# Patient Record
Sex: MALE | HISPANIC OR LATINO | URBAN - METROPOLITAN AREA
[De-identification: names, ages, dates, MRNs, and addresses within clinical notes are randomized per-mention and may not be internally consistent; named-entity substitution may affect disease eponyms.]

---

## 2019-03-30 ENCOUNTER — EMERGENCY (EMERGENCY)
Facility: HOSPITAL | Age: 34
LOS: 1 days | Discharge: HOME | End: 2019-03-30
Admitting: EMERGENCY MEDICINE
Payer: MEDICAID

## 2019-03-30 VITALS
HEART RATE: 72 BPM | SYSTOLIC BLOOD PRESSURE: 124 MMHG | OXYGEN SATURATION: 100 % | TEMPERATURE: 98 F | DIASTOLIC BLOOD PRESSURE: 97 MMHG | RESPIRATION RATE: 16 BRPM

## 2019-03-30 PROCEDURE — 73030 X-RAY EXAM OF SHOULDER: CPT | Mod: 26,LT

## 2019-03-30 PROCEDURE — 99283 EMERGENCY DEPT VISIT LOW MDM: CPT

## 2019-03-30 NOTE — ED ADULT TRIAGE NOTE - CHIEF COMPLAINT QUOTE
Pt c/o L shoulder pain x 7 months worsened in past month. States he broke his arm & got sx but "wasn't finished... it's splinted.. there's a bone sticking out."

## 2019-03-30 NOTE — ED PROVIDER NOTE - OBJECTIVE STATEMENT
34 y/o male w/ left shoulder pain x3 years. Pt admits to left shoulder injury while living in [place]. Pt had shoulder surgery at that time for "broken shoulder". 32 y/o male w/ left shoulder pain x3 years. Pt admits to left shoulder injury while living in [place]. Pt had shoulder surgery at that time for "broken shoulder". Pt describes that his shoulder bone was chipped away and he needed a "bone graft" but the surgeon was unable to complate the surgery. Pt then lost his insurance and has not been able to get it fixed. Pt recently moved back to [place] and wanted to be evaluated. Pt denies chest pain, sob, abd pain, n/v/d, numbness ,tingling, weakness, fever or chills.

## 2019-03-31 NOTE — ED POST DISCHARGE NOTE - RESULT SUMMARY
LT shoulder xray : 2cm well corticated ossific particle inferior to the Lt glenoid, may be sequela of old injury, likely chronic. Patient contact # [phone] Msg left with call back PA # and hrs alt # [phone].

## 2019-04-02 PROBLEM — Z78.9 OTHER SPECIFIED HEALTH STATUS: Chronic | Status: ACTIVE | Noted: 2019-03-30

## 2019-04-03 PROBLEM — Z00.00 ENCOUNTER FOR PREVENTIVE HEALTH EXAMINATION: Status: ACTIVE | Noted: 2019-04-03

## 2019-04-18 ENCOUNTER — APPOINTMENT (OUTPATIENT)
Dept: ORTHOPEDIC SURGERY | Facility: CLINIC | Age: 34
End: 2019-04-18
Payer: MEDICAID

## 2019-04-18 VITALS
HEIGHT: 69 IN | DIASTOLIC BLOOD PRESSURE: 89 MMHG | SYSTOLIC BLOOD PRESSURE: 130 MMHG | WEIGHT: 155 LBS | BODY MASS INDEX: 22.96 KG/M2 | HEART RATE: 80 BPM

## 2019-04-18 PROCEDURE — 73030 X-RAY EXAM OF SHOULDER: CPT

## 2019-04-18 PROCEDURE — 99203 OFFICE O/P NEW LOW 30 MIN: CPT

## 2019-05-16 ENCOUNTER — FORM ENCOUNTER (OUTPATIENT)
Age: 34
End: 2019-05-16

## 2019-05-17 ENCOUNTER — OUTPATIENT (OUTPATIENT)
Dept: OUTPATIENT SERVICES | Facility: HOSPITAL | Age: 34
LOS: 1 days | End: 2019-05-17
Payer: COMMERCIAL

## 2019-05-17 ENCOUNTER — APPOINTMENT (OUTPATIENT)
Dept: CT IMAGING | Facility: CLINIC | Age: 34
End: 2019-05-17
Payer: MEDICAID

## 2019-05-17 ENCOUNTER — APPOINTMENT (OUTPATIENT)
Dept: MRI IMAGING | Facility: CLINIC | Age: 34
End: 2019-05-17
Payer: MEDICAID

## 2019-05-17 DIAGNOSIS — Z00.8 ENCOUNTER FOR OTHER GENERAL EXAMINATION: ICD-10-CM

## 2019-05-17 PROCEDURE — 76376 3D RENDER W/INTRP POSTPROCES: CPT

## 2019-05-17 PROCEDURE — 73221 MRI JOINT UPR EXTREM W/O DYE: CPT | Mod: 26,LT

## 2019-05-17 PROCEDURE — 76376 3D RENDER W/INTRP POSTPROCES: CPT | Mod: 26

## 2019-05-17 PROCEDURE — 73221 MRI JOINT UPR EXTREM W/O DYE: CPT

## 2019-05-17 PROCEDURE — 73200 CT UPPER EXTREMITY W/O DYE: CPT | Mod: 26,LT

## 2019-05-17 PROCEDURE — 73200 CT UPPER EXTREMITY W/O DYE: CPT

## 2019-05-29 ENCOUNTER — APPOINTMENT (OUTPATIENT)
Dept: ORTHOPEDIC SURGERY | Facility: CLINIC | Age: 34
End: 2019-05-29
Payer: MEDICAID

## 2019-05-29 VITALS
WEIGHT: 155 LBS | DIASTOLIC BLOOD PRESSURE: 84 MMHG | HEART RATE: 81 BPM | SYSTOLIC BLOOD PRESSURE: 124 MMHG | BODY MASS INDEX: 22.96 KG/M2 | HEIGHT: 69 IN

## 2019-05-29 PROCEDURE — 99213 OFFICE O/P EST LOW 20 MIN: CPT

## 2019-07-23 ENCOUNTER — OUTPATIENT (OUTPATIENT)
Dept: OUTPATIENT SERVICES | Facility: HOSPITAL | Age: 34
LOS: 1 days | End: 2019-07-23

## 2019-07-23 VITALS
RESPIRATION RATE: 18 BRPM | SYSTOLIC BLOOD PRESSURE: 130 MMHG | WEIGHT: 177.03 LBS | TEMPERATURE: 98 F | DIASTOLIC BLOOD PRESSURE: 82 MMHG | OXYGEN SATURATION: 98 % | HEIGHT: 69 IN | HEART RATE: 56 BPM

## 2019-07-23 DIAGNOSIS — M24.412 RECURRENT DISLOCATION, LEFT SHOULDER: ICD-10-CM

## 2019-07-23 DIAGNOSIS — G47.33 OBSTRUCTIVE SLEEP APNEA (ADULT) (PEDIATRIC): ICD-10-CM

## 2019-07-23 DIAGNOSIS — Z90.49 ACQUIRED ABSENCE OF OTHER SPECIFIED PARTS OF DIGESTIVE TRACT: Chronic | ICD-10-CM

## 2019-07-23 DIAGNOSIS — S49.90XA UNSPECIFIED INJURY OF SHOULDER AND UPPER ARM, UNSPECIFIED ARM, INITIAL ENCOUNTER: Chronic | ICD-10-CM

## 2019-07-23 LAB
BLD GP AB SCN SERPL QL: NEGATIVE — SIGNIFICANT CHANGE UP
HCT VFR BLD CALC: 44.3 % — SIGNIFICANT CHANGE UP (ref 39–50)
HGB BLD-MCNC: 14.9 G/DL — SIGNIFICANT CHANGE UP (ref 13–17)
MCHC RBC-ENTMCNC: 30.5 PG — SIGNIFICANT CHANGE UP (ref 27–34)
MCHC RBC-ENTMCNC: 33.6 % — SIGNIFICANT CHANGE UP (ref 32–36)
MCV RBC AUTO: 90.6 FL — SIGNIFICANT CHANGE UP (ref 80–100)
NRBC # FLD: 0 K/UL — SIGNIFICANT CHANGE UP (ref 0–0)
PLATELET # BLD AUTO: 279 K/UL — SIGNIFICANT CHANGE UP (ref 150–400)
PMV BLD: 9.5 FL — SIGNIFICANT CHANGE UP (ref 7–13)
RBC # BLD: 4.89 M/UL — SIGNIFICANT CHANGE UP (ref 4.2–5.8)
RBC # FLD: 12.4 % — SIGNIFICANT CHANGE UP (ref 10.3–14.5)
RH IG SCN BLD-IMP: POSITIVE — SIGNIFICANT CHANGE UP
WBC # BLD: 6.11 K/UL — SIGNIFICANT CHANGE UP (ref 3.8–10.5)
WBC # FLD AUTO: 6.11 K/UL — SIGNIFICANT CHANGE UP (ref 3.8–10.5)

## 2019-07-23 RX ORDER — SODIUM CHLORIDE 9 MG/ML
1000 INJECTION, SOLUTION INTRAVENOUS
Refills: 0 | Status: DISCONTINUED | OUTPATIENT
Start: 2019-07-30 | End: 2019-08-15

## 2019-07-23 NOTE — H&P PST ADULT - RS GEN PE MLT RESP DETAILS PC
good air movement/airway patent/respirations non-labored/clear to auscultation bilaterally/breath sounds equal/no wheezes

## 2019-07-23 NOTE — H&P PST ADULT - NSICDXPROBLEM_GEN_ALL_CORE_FT
PROBLEM DIAGNOSES  Problem: Recurrent dislocation, left shoulder  Assessment and Plan:  scheduled for left shoulder open stabilization with distal tibial allografting to glenoid on 07/30/2019.   Verbal and written pre-op instructions provided to patient. Patient verbalized understanding.   Pepcid for GI prophylaxis provided.   patient given verbal and written instruction with teach back on chlorhexidine shampoo, and the patient verbalized understanding with return demonstration.   Patient will obtain medical clearance as per surgeons request-copy requested    Problem: TRE (obstructive sleep apnea)  Assessment and Plan: TRE precautions-OR booking notified

## 2019-07-23 NOTE — H&P PST ADULT - ASSESSMENT
34 yo male presents to PST unit with pre-op diagnosis of recurrent dislocation, left shoulder scheduled for left shoulder open stabilization with distal tibial allografting to glenoid on 07/30/2019.

## 2019-07-23 NOTE — H&P PST ADULT - HISTORY OF PRESENT ILLNESS
34 yo male presents to PST unit with pre-op diagnosis of recurrent dislocation, left shoulder scheduled for left shoulder open stabilization with distal tibial allografting to glenoid on 07/30/2019. He reports multiple dislocations of his left shoulder due to heavy lifting s/p CT scan and MRI of left shoulder.

## 2019-07-23 NOTE — H&P PST ADULT - NSICDXPASTSURGICALHX_GEN_ALL_CORE_FT
PAST SURGICAL HISTORY:  History of appendectomy     History of cholecystectomy     Injury of shoulder left

## 2019-07-29 ENCOUNTER — TRANSCRIPTION ENCOUNTER (OUTPATIENT)
Age: 34
End: 2019-07-29

## 2019-07-29 RX ORDER — SODIUM CHLORIDE 9 MG/ML
1000 INJECTION, SOLUTION INTRAVENOUS
Refills: 0 | Status: DISCONTINUED | OUTPATIENT
Start: 2019-07-30 | End: 2019-08-15

## 2019-07-29 NOTE — ASU DISCHARGE PLAN (ADULT/PEDIATRIC) - CALL YOUR DOCTOR IF YOU HAVE ANY OF THE FOLLOWING:
Swelling that gets worse/Bleeding that does not stop/Nausea and vomiting that does not stop/Inability to tolerate liquids or foods/Numbness, tingling, color or temperature change to extremity/Pain not relieved by Medications/Increased irritability or sluggishness

## 2019-07-29 NOTE — ASU DISCHARGE PLAN (ADULT/PEDIATRIC) - CARE PROVIDER_API CALL
Hunter Sun)  Orthopaedic Sports Medicine; Orthopaedic Surgery  [address]  [place] [address]  Phone: [phone]  Fax: [phone]  Follow Up Time:

## 2019-07-30 ENCOUNTER — OUTPATIENT (OUTPATIENT)
Dept: OUTPATIENT SERVICES | Facility: HOSPITAL | Age: 34
LOS: 1 days | Discharge: HOME | End: 2019-07-30
Payer: MEDICAID

## 2019-07-30 ENCOUNTER — APPOINTMENT (OUTPATIENT)
Dept: ORTHOPEDIC SURGERY | Facility: AMBULATORY SURGERY CENTER | Age: 34
End: 2019-07-30

## 2019-07-30 VITALS
SYSTOLIC BLOOD PRESSURE: 116 MMHG | OXYGEN SATURATION: 100 % | HEART RATE: 84 BPM | TEMPERATURE: 98 F | DIASTOLIC BLOOD PRESSURE: 85 MMHG

## 2019-07-30 VITALS
SYSTOLIC BLOOD PRESSURE: 120 MMHG | OXYGEN SATURATION: 98 % | HEIGHT: 69 IN | DIASTOLIC BLOOD PRESSURE: 91 MMHG | WEIGHT: 177.03 LBS | HEART RATE: 72 BPM | TEMPERATURE: 98 F | RESPIRATION RATE: 18 BRPM

## 2019-07-30 DIAGNOSIS — Z90.49 ACQUIRED ABSENCE OF OTHER SPECIFIED PARTS OF DIGESTIVE TRACT: Chronic | ICD-10-CM

## 2019-07-30 DIAGNOSIS — S49.90XA UNSPECIFIED INJURY OF SHOULDER AND UPPER ARM, UNSPECIFIED ARM, INITIAL ENCOUNTER: Chronic | ICD-10-CM

## 2019-07-30 DIAGNOSIS — M24.412 RECURRENT DISLOCATION, LEFT SHOULDER: ICD-10-CM

## 2019-07-30 PROCEDURE — 23460 REPAIR SHOULDER CAPSULE: CPT | Mod: LT

## 2019-08-07 ENCOUNTER — APPOINTMENT (OUTPATIENT)
Dept: ORTHOPEDIC SURGERY | Facility: CLINIC | Age: 34
End: 2019-08-07
Payer: MEDICAID

## 2019-08-07 PROBLEM — M24.412 RECURRENT DISLOCATION, LEFT SHOULDER: Chronic | Status: ACTIVE | Noted: 2019-07-23

## 2019-08-07 PROCEDURE — 99024 POSTOP FOLLOW-UP VISIT: CPT

## 2019-08-07 PROCEDURE — 73030 X-RAY EXAM OF SHOULDER: CPT

## 2019-08-07 RX ORDER — OXYCODONE AND ACETAMINOPHEN 5; 325 MG/1; MG/1
5-325 TABLET ORAL
Qty: 20 | Refills: 0 | Status: ACTIVE | COMMUNITY
Start: 2019-08-07 | End: 1900-01-01

## 2019-08-07 NOTE — HISTORY OF PRESENT ILLNESS
[___ Days Post Op] : post op day #[unfilled] [Clean/Dry/Intact] : clean, dry and intact [Neuro Intact] : an unremarkable neurological exam [Vascular Intact] : ~T peripheral vascular exam normal [4] : the patient reports pain that is 4/10 in severity [Slow Progress] : is progressing slowly [Chills] : no chills [Fever] : no fever [Erythema] : not erythematous [Discharge] : absent of discharge [Dehiscence] : not dehisced [de-identified] : Left shoulder open anterior stabilization with distal tibial allograft - fresh osteoarticular allograft to the glenoid with capsulorrhaphy on 7/30/19 [de-identified] : Compliant with shoulder brace [de-identified] : Left shoulder and anterior surgical incision site is clean and dry. Mild subcutaneous soft tissue swelling. Sensation intact to light touch left shoulder/arm. Able to actively flex the left elbow and is normal use of the left wrist and hand. [de-identified] : X-rays left shoulderAP and scapular Y. views reveal distal tibial allograft augmentation to the anterior glenoid with a screw and washer fixation [de-identified] : I advised on exercises her left upper extremity. Passive motion left shoulder reviewed. Active range of motion left elbow, wrist and hand.  Continue brace.   Analgesics prn.  Followup in 2 weeks

## 2019-09-06 ENCOUNTER — APPOINTMENT (OUTPATIENT)
Dept: ORTHOPEDIC SURGERY | Facility: CLINIC | Age: 34
End: 2019-09-06
Payer: MEDICAID

## 2019-09-06 PROCEDURE — 99024 POSTOP FOLLOW-UP VISIT: CPT

## 2019-10-18 ENCOUNTER — APPOINTMENT (OUTPATIENT)
Dept: ORTHOPEDIC SURGERY | Facility: CLINIC | Age: 34
End: 2019-10-18
Payer: MEDICAID

## 2019-10-18 PROCEDURE — 73030 X-RAY EXAM OF SHOULDER: CPT

## 2019-10-18 PROCEDURE — 99024 POSTOP FOLLOW-UP VISIT: CPT

## 2019-12-04 ENCOUNTER — APPOINTMENT (OUTPATIENT)
Dept: ORTHOPEDIC SURGERY | Facility: CLINIC | Age: 34
End: 2019-12-04
Payer: MEDICAID

## 2019-12-04 PROCEDURE — 73030 X-RAY EXAM OF SHOULDER: CPT | Mod: LT

## 2019-12-04 PROCEDURE — 99213 OFFICE O/P EST LOW 20 MIN: CPT

## 2020-02-05 ENCOUNTER — APPOINTMENT (OUTPATIENT)
Dept: ORTHOPEDIC SURGERY | Facility: CLINIC | Age: 35
End: 2020-02-05
Payer: MEDICAID

## 2020-02-05 VITALS
DIASTOLIC BLOOD PRESSURE: 90 MMHG | BODY MASS INDEX: 25.62 KG/M2 | HEIGHT: 69 IN | HEART RATE: 90 BPM | WEIGHT: 173 LBS | SYSTOLIC BLOOD PRESSURE: 121 MMHG

## 2020-02-05 PROCEDURE — 99213 OFFICE O/P EST LOW 20 MIN: CPT

## 2020-02-12 ENCOUNTER — FORM ENCOUNTER (OUTPATIENT)
Age: 35
End: 2020-02-12

## 2020-02-13 ENCOUNTER — APPOINTMENT (OUTPATIENT)
Dept: CT IMAGING | Facility: CLINIC | Age: 35
End: 2020-02-13
Payer: MEDICAID

## 2020-02-13 ENCOUNTER — OUTPATIENT (OUTPATIENT)
Dept: OUTPATIENT SERVICES | Facility: HOSPITAL | Age: 35
LOS: 1 days | End: 2020-02-13
Payer: MEDICAID

## 2020-02-13 DIAGNOSIS — S49.90XA UNSPECIFIED INJURY OF SHOULDER AND UPPER ARM, UNSPECIFIED ARM, INITIAL ENCOUNTER: Chronic | ICD-10-CM

## 2020-02-13 DIAGNOSIS — Z90.49 ACQUIRED ABSENCE OF OTHER SPECIFIED PARTS OF DIGESTIVE TRACT: Chronic | ICD-10-CM

## 2020-02-13 PROCEDURE — 73200 CT UPPER EXTREMITY W/O DYE: CPT | Mod: 26,LT

## 2020-02-13 PROCEDURE — 73200 CT UPPER EXTREMITY W/O DYE: CPT

## 2020-02-13 PROCEDURE — 76376 3D RENDER W/INTRP POSTPROCES: CPT

## 2020-02-27 ENCOUNTER — APPOINTMENT (OUTPATIENT)
Dept: ORTHOPEDIC SURGERY | Facility: CLINIC | Age: 35
End: 2020-02-27
Payer: MEDICAID

## 2020-02-27 PROCEDURE — 99213 OFFICE O/P EST LOW 20 MIN: CPT

## 2020-05-21 ENCOUNTER — APPOINTMENT (OUTPATIENT)
Dept: ORTHOPEDIC SURGERY | Facility: CLINIC | Age: 35
End: 2020-05-21
Payer: MEDICAID

## 2020-05-21 VITALS — TEMPERATURE: 96.2 F

## 2020-05-21 PROCEDURE — 99213 OFFICE O/P EST LOW 20 MIN: CPT

## 2020-05-21 PROCEDURE — 73030 X-RAY EXAM OF SHOULDER: CPT | Mod: LT

## 2020-09-04 ENCOUNTER — APPOINTMENT (OUTPATIENT)
Dept: ORTHOPEDIC SURGERY | Facility: CLINIC | Age: 35
End: 2020-09-04
Payer: MEDICAID

## 2020-09-04 VITALS
HEART RATE: 118 BPM | BODY MASS INDEX: 26.07 KG/M2 | WEIGHT: 176 LBS | HEIGHT: 69 IN | DIASTOLIC BLOOD PRESSURE: 71 MMHG | TEMPERATURE: 97 F | SYSTOLIC BLOOD PRESSURE: 116 MMHG

## 2020-09-04 PROCEDURE — 99213 OFFICE O/P EST LOW 20 MIN: CPT

## 2020-09-04 PROCEDURE — 73030 X-RAY EXAM OF SHOULDER: CPT | Mod: LT

## 2021-03-05 ENCOUNTER — APPOINTMENT (OUTPATIENT)
Dept: ORTHOPEDIC SURGERY | Facility: CLINIC | Age: 36
End: 2021-03-05
Payer: MEDICAID

## 2021-03-05 DIAGNOSIS — T84.84XA PAIN DUE TO INTERNAL ORTHOPEDIC PROSTHETIC DEVICES, IMPLANTS AND GRAFTS, INITIAL ENCOUNTER: ICD-10-CM

## 2021-03-05 DIAGNOSIS — M24.412 RECURRENT DISLOCATION, LEFT SHOULDER: ICD-10-CM

## 2021-03-05 DIAGNOSIS — M19.212 SECONDARY OSTEOARTHRITIS, LEFT SHOULDER: ICD-10-CM

## 2021-03-05 PROCEDURE — 73030 X-RAY EXAM OF SHOULDER: CPT | Mod: LT

## 2021-03-05 PROCEDURE — 99213 OFFICE O/P EST LOW 20 MIN: CPT

## 2021-03-05 PROCEDURE — 99072 ADDL SUPL MATRL&STAF TM PHE: CPT
